# Patient Record
Sex: MALE | Race: ASIAN | NOT HISPANIC OR LATINO | URBAN - METROPOLITAN AREA
[De-identification: names, ages, dates, MRNs, and addresses within clinical notes are randomized per-mention and may not be internally consistent; named-entity substitution may affect disease eponyms.]

---
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
## 2024-02-19 ENCOUNTER — EMERGENCY (EMERGENCY)
Facility: HOSPITAL | Age: 24
LOS: 1 days | Discharge: HOME | End: 2024-02-19
Attending: STUDENT IN AN ORGANIZED HEALTH CARE EDUCATION/TRAINING PROGRAM | Admitting: STUDENT IN AN ORGANIZED HEALTH CARE EDUCATION/TRAINING PROGRAM
Payer: MEDICAID

## 2024-02-19 VITALS
HEART RATE: 83 BPM | TEMPERATURE: 98 F | OXYGEN SATURATION: 100 % | RESPIRATION RATE: 16 BRPM | DIASTOLIC BLOOD PRESSURE: 75 MMHG | SYSTOLIC BLOOD PRESSURE: 111 MMHG

## 2024-02-19 VITALS
RESPIRATION RATE: 16 BRPM | TEMPERATURE: 99 F | SYSTOLIC BLOOD PRESSURE: 115 MMHG | HEART RATE: 81 BPM | DIASTOLIC BLOOD PRESSURE: 75 MMHG | OXYGEN SATURATION: 100 %

## 2024-02-19 LAB
ADD ON TEST-SPECIMEN IN LAB: SIGNIFICANT CHANGE UP
ALBUMIN SERPL ELPH-MCNC: 4.4 G/DL — SIGNIFICANT CHANGE UP (ref 3.3–5)
ALP SERPL-CCNC: 64 U/L — SIGNIFICANT CHANGE UP (ref 40–120)
ALT FLD-CCNC: 18 U/L — SIGNIFICANT CHANGE UP (ref 4–41)
AMPHET UR-MCNC: NEGATIVE — SIGNIFICANT CHANGE UP
ANION GAP SERPL CALC-SCNC: 13 MMOL/L — SIGNIFICANT CHANGE UP (ref 7–14)
APAP SERPL-MCNC: <10 UG/ML — LOW (ref 15–25)
APPEARANCE UR: CLEAR — SIGNIFICANT CHANGE UP
AST SERPL-CCNC: 19 U/L — SIGNIFICANT CHANGE UP (ref 4–40)
BARBITURATES UR SCN-MCNC: NEGATIVE — SIGNIFICANT CHANGE UP
BASOPHILS # BLD AUTO: 0.01 K/UL — SIGNIFICANT CHANGE UP (ref 0–0.2)
BASOPHILS NFR BLD AUTO: 0.2 % — SIGNIFICANT CHANGE UP (ref 0–2)
BENZODIAZ UR-MCNC: NEGATIVE — SIGNIFICANT CHANGE UP
BILIRUB SERPL-MCNC: 0.3 MG/DL — SIGNIFICANT CHANGE UP (ref 0.2–1.2)
BILIRUB UR-MCNC: NEGATIVE — SIGNIFICANT CHANGE UP
BUN SERPL-MCNC: 8 MG/DL — SIGNIFICANT CHANGE UP (ref 7–23)
CALCIUM SERPL-MCNC: 9.8 MG/DL — SIGNIFICANT CHANGE UP (ref 8.4–10.5)
CHLORIDE SERPL-SCNC: 103 MMOL/L — SIGNIFICANT CHANGE UP (ref 98–107)
CO2 SERPL-SCNC: 23 MMOL/L — SIGNIFICANT CHANGE UP (ref 22–31)
COCAINE METAB.OTHER UR-MCNC: NEGATIVE — SIGNIFICANT CHANGE UP
COLOR SPEC: YELLOW — SIGNIFICANT CHANGE UP
CREAT SERPL-MCNC: 0.78 MG/DL — SIGNIFICANT CHANGE UP (ref 0.5–1.3)
CREATININE URINE RESULT, DAU: 64 MG/DL — SIGNIFICANT CHANGE UP
DIFF PNL FLD: NEGATIVE — SIGNIFICANT CHANGE UP
EGFR: 129 ML/MIN/1.73M2 — SIGNIFICANT CHANGE UP
EOSINOPHIL # BLD AUTO: 0.02 K/UL — SIGNIFICANT CHANGE UP (ref 0–0.5)
EOSINOPHIL NFR BLD AUTO: 0.3 % — SIGNIFICANT CHANGE UP (ref 0–6)
ETHANOL SERPL-MCNC: <10 MG/DL — SIGNIFICANT CHANGE UP
FLUAV AG NPH QL: SIGNIFICANT CHANGE UP
FLUBV AG NPH QL: SIGNIFICANT CHANGE UP
GLUCOSE SERPL-MCNC: 113 MG/DL — HIGH (ref 70–99)
GLUCOSE UR QL: NEGATIVE MG/DL — SIGNIFICANT CHANGE UP
HCT VFR BLD CALC: 42.3 % — SIGNIFICANT CHANGE UP (ref 39–50)
HGB BLD-MCNC: 13.6 G/DL — SIGNIFICANT CHANGE UP (ref 13–17)
IANC: 3.81 K/UL — SIGNIFICANT CHANGE UP (ref 1.8–7.4)
IMM GRANULOCYTES NFR BLD AUTO: 0.2 % — SIGNIFICANT CHANGE UP (ref 0–0.9)
KETONES UR-MCNC: NEGATIVE MG/DL — SIGNIFICANT CHANGE UP
LEUKOCYTE ESTERASE UR-ACNC: NEGATIVE — SIGNIFICANT CHANGE UP
LYMPHOCYTES # BLD AUTO: 1.66 K/UL — SIGNIFICANT CHANGE UP (ref 1–3.3)
LYMPHOCYTES # BLD AUTO: 28.3 % — SIGNIFICANT CHANGE UP (ref 13–44)
MAGNESIUM SERPL-MCNC: 2.3 MG/DL — SIGNIFICANT CHANGE UP (ref 1.6–2.6)
MCHC RBC-ENTMCNC: 27.5 PG — SIGNIFICANT CHANGE UP (ref 27–34)
MCHC RBC-ENTMCNC: 32.2 GM/DL — SIGNIFICANT CHANGE UP (ref 32–36)
MCV RBC AUTO: 85.5 FL — SIGNIFICANT CHANGE UP (ref 80–100)
METHADONE UR-MCNC: NEGATIVE — SIGNIFICANT CHANGE UP
MONOCYTES # BLD AUTO: 0.36 K/UL — SIGNIFICANT CHANGE UP (ref 0–0.9)
MONOCYTES NFR BLD AUTO: 6.1 % — SIGNIFICANT CHANGE UP (ref 2–14)
NEUTROPHILS # BLD AUTO: 3.81 K/UL — SIGNIFICANT CHANGE UP (ref 1.8–7.4)
NEUTROPHILS NFR BLD AUTO: 64.9 % — SIGNIFICANT CHANGE UP (ref 43–77)
NITRITE UR-MCNC: NEGATIVE — SIGNIFICANT CHANGE UP
NRBC # BLD: 0 /100 WBCS — SIGNIFICANT CHANGE UP (ref 0–0)
NRBC # FLD: 0 K/UL — SIGNIFICANT CHANGE UP (ref 0–0)
OPIATES UR-MCNC: NEGATIVE — SIGNIFICANT CHANGE UP
OXYCODONE UR-MCNC: NEGATIVE — SIGNIFICANT CHANGE UP
PCP SPEC-MCNC: SIGNIFICANT CHANGE UP
PCP UR-MCNC: NEGATIVE — SIGNIFICANT CHANGE UP
PH UR: 6.5 — SIGNIFICANT CHANGE UP (ref 5–8)
PHOSPHATE SERPL-MCNC: 3.3 MG/DL — SIGNIFICANT CHANGE UP (ref 2.5–4.5)
PLATELET # BLD AUTO: 317 K/UL — SIGNIFICANT CHANGE UP (ref 150–400)
POTASSIUM SERPL-MCNC: 4.2 MMOL/L — SIGNIFICANT CHANGE UP (ref 3.5–5.3)
POTASSIUM SERPL-SCNC: 4.2 MMOL/L — SIGNIFICANT CHANGE UP (ref 3.5–5.3)
PROT SERPL-MCNC: 7.7 G/DL — SIGNIFICANT CHANGE UP (ref 6–8.3)
PROT UR-MCNC: NEGATIVE MG/DL — SIGNIFICANT CHANGE UP
RBC # BLD: 4.95 M/UL — SIGNIFICANT CHANGE UP (ref 4.2–5.8)
RBC # FLD: 12 % — SIGNIFICANT CHANGE UP (ref 10.3–14.5)
RSV RNA NPH QL NAA+NON-PROBE: SIGNIFICANT CHANGE UP
SALICYLATES SERPL-MCNC: <0.3 MG/DL — LOW (ref 15–30)
SARS-COV-2 RNA SPEC QL NAA+PROBE: SIGNIFICANT CHANGE UP
SODIUM SERPL-SCNC: 139 MMOL/L — SIGNIFICANT CHANGE UP (ref 135–145)
SP GR SPEC: 1.01 — SIGNIFICANT CHANGE UP (ref 1–1.03)
THC UR QL: POSITIVE
TOXICOLOGY SCREEN, DRUGS OF ABUSE, SERUM RESULT: SIGNIFICANT CHANGE UP
TSH SERPL-MCNC: <0.1 UIU/ML — LOW (ref 0.27–4.2)
UROBILINOGEN FLD QL: 0.2 MG/DL — SIGNIFICANT CHANGE UP (ref 0.2–1)
WBC # BLD: 5.87 K/UL — SIGNIFICANT CHANGE UP (ref 3.8–10.5)
WBC # FLD AUTO: 5.87 K/UL — SIGNIFICANT CHANGE UP (ref 3.8–10.5)

## 2024-02-19 PROCEDURE — 99284 EMERGENCY DEPT VISIT MOD MDM: CPT

## 2024-02-19 RX ORDER — METOCLOPRAMIDE HCL 10 MG
10 TABLET ORAL ONCE
Refills: 0 | Status: COMPLETED | OUTPATIENT
Start: 2024-02-19 | End: 2024-02-19

## 2024-02-19 RX ORDER — SODIUM CHLORIDE 9 MG/ML
1000 INJECTION INTRAMUSCULAR; INTRAVENOUS; SUBCUTANEOUS ONCE
Refills: 0 | Status: COMPLETED | OUTPATIENT
Start: 2024-02-19 | End: 2024-02-19

## 2024-02-19 RX ORDER — FAMOTIDINE 10 MG/ML
1 INJECTION INTRAVENOUS
Qty: 14 | Refills: 0
Start: 2024-02-19 | End: 2024-02-25

## 2024-02-19 RX ORDER — HYDROXYZINE HCL 10 MG
1 TABLET ORAL
Qty: 5 | Refills: 0
Start: 2024-02-19 | End: 2024-02-23

## 2024-02-19 RX ORDER — ONDANSETRON 8 MG/1
1 TABLET, FILM COATED ORAL
Qty: 12 | Refills: 0
Start: 2024-02-19 | End: 2024-02-22

## 2024-02-19 RX ORDER — FAMOTIDINE 10 MG/ML
20 INJECTION INTRAVENOUS ONCE
Refills: 0 | Status: COMPLETED | OUTPATIENT
Start: 2024-02-19 | End: 2024-02-19

## 2024-02-19 RX ORDER — HYDROXYZINE HCL 10 MG
25 TABLET ORAL ONCE
Refills: 0 | Status: COMPLETED | OUTPATIENT
Start: 2024-02-19 | End: 2024-02-19

## 2024-02-19 RX ADMIN — FAMOTIDINE 20 MILLIGRAM(S): 10 INJECTION INTRAVENOUS at 18:27

## 2024-02-19 RX ADMIN — Medication 25 MILLIGRAM(S): at 21:10

## 2024-02-19 RX ADMIN — Medication 10 MILLIGRAM(S): at 18:27

## 2024-02-19 RX ADMIN — SODIUM CHLORIDE 1000 MILLILITER(S): 9 INJECTION INTRAMUSCULAR; INTRAVENOUS; SUBCUTANEOUS at 18:27

## 2024-02-19 NOTE — ED PROVIDER NOTE - PATIENT PORTAL LINK FT
You can access the FollowMyHealth Patient Portal offered by [facility] by registering at the following website: [email or web address]. By joining [employer]’s FollowMyHealth portal, you will also be able to view your health information using other applications (apps) compatible with our system.

## 2024-02-19 NOTE — ED PROVIDER NOTE - PHYSICAL EXAMINATION
Vital signs reviewed.   CONSTITUTIONAL: Well-appearing; well-nourished; in no apparent distress. Non-toxic appearing.   HEAD: Normocephalic, atraumatic.  EYES: PERRL, EOM intact, conjunctiva and sclera WNL.  CARD: Normal S1, S2; no murmurs, rubs, or gallops noted.  RESP: Normal chest excursion with respiration; breath sounds clear and equal bilaterally; no wheezes, rhonchi, or rales.  ABD/GI: soft, non-distended; non-tender; no palpable organomegaly, no pulsatile mass.  EXT/MS: moves all extremities; distal pulses are normal, no pedal edema.  SKIN: Normal for age and race; warm; dry; good turgor; no apparent lesions or exudate noted.  NEURO: Awake, alert, oriented x 3, no gross deficits, CN II-XII grossly intact, no motor or sensory deficit noted.  PSYCH: Normal mood; appropriate affect. Reporting anxiousness, appears mildly anxious.

## 2024-02-19 NOTE — SBIRT NOTE ADULT - NSSBIRTDRGPASSREFTXDET_GEN_A_CORE
SW met with patient and mother at bedside. Patient provided consent for mother to be present for conversation. Patient reports that he has been  SW met with patient and mother at bedside. Patient provided consent for mother to be present for conversation. Patient reports that over the past year he has been using opioids, mainly Percocet, that he was sold on the streets. Patient denies any use since Saturday, 2 days ago and is experiencing nausea, diarrhea, abdominal cramping, chills, subjective fevers, nasal congestion, increased urination, anxiousness, restlessness.  Patient presents to the emergency department with his mother asking for help and assistance with information to stop. SW supported and provided education on SBIRT resources which patient accepted and agreed to call. SBIRT resources are at bedside. SW to follow as needed.

## 2024-02-19 NOTE — ED PROVIDER NOTE - PROGRESS NOTE DETAILS
patient labs wnl, aside from TSH < 0.10.   Thyroid panel added on. Call back team called to follow up. Patient requesting to go.

## 2024-02-19 NOTE — ED PROVIDER NOTE - CLINICAL SUMMARY MEDICAL DECISION MAKING FREE TEXT BOX
23-year-old male with no reported past medical history, reports over the past year he has been using opioids that he was sold on the streets and since Saturday, 2 days ago patient stopped using Percocet and is coming in with complaint of nausea multiple episodes of nonbloody nonbilious vomiting, diarrhea, abdominal cramping, chills, subjective fevers, nasal congestion, increased urination, anxiousness, restlessness.  Patient presents to the emergency department with his mother asking for help and assistance with information to stop.    Patient vitally stable and overall well-appearing, mildly anxious with a nonfocal nontender examination.  Patient describing signs of opioid withdrawal.  Check basic labs, provide symptom relief with fluids, Pepcid, Reglan.  Social work involved to provide patient information for help stopping opioid abuse

## 2024-02-19 NOTE — ED ADULT NURSE NOTE - OBJECTIVE STATEMENT
Patient received to intake room 5 A&Ox4, ambulatory, accompanied by family member endorsing general malaise N/V over the last 2 days. Patient endorsing to take approximately 2 percocet everyday x 1 year, last took on Saturday. RR equal and unlabored, noted to be mildly tremulous. Denies chest pain, sob, blurry vision. Denies SI,HI, hallucinations. Denies alcohol use. Endorsing intermittent marijuana use. 20G IV placed in the R AC, labs drawn and sent. Care plan continued. Comfort measures provided. Safety maintained. Awaiting lab results.

## 2024-02-19 NOTE — ED PROVIDER NOTE - NSFOLLOWUPINSTRUCTIONS_ED_ALL_ED_FT
Your labs are normal aside from a low thyroid-stimulating hormone.  More labs were added on to be followed up by our callback team.  However you can call [phone] for results.  Take Zofran 4 mg dissolving tablet every 8 hours as needed for nausea vomiting.  Can take Atarax at bedtime as needed for restlessness/anxiety.  Can take Pepcid 20 mg every 12 hours as needed for stomach upset.    Opioid Withdrawal  Opioids are powerful substances that relieve pain. Opioids include illegal drugs, such as heroin, as well as prescription pain medicines, such as codeine, morphine, hydrocodone, and fentanyl. Opioid withdrawal can happen if you have been taking opioids for a period of time and then suddenly stop.    Opioid withdrawal can be mild to severe, and it may include a variety of different symptoms. It is not usually life-threatening.    What are the causes?  This condition is caused by taking opioids for a long period of time, usually over several weeks (or even only 5 days), and then doing any of the following:  Stopping use completely.  Rapidly reducing their use, either by reducing the dose or the frequency of use.  Taking a medicine to block the effect of the opioid (opioid antagonist).  What increases the risk?  The following factors may make you more likely to develop this condition:  Taking opioids for a long period of time.  Taking opioids incorrectly.  Having a history of opioid, alcohol, or illicit drug use and withdrawal from those substances.  What are the signs or symptoms?  Symptoms of this condition can be physical or mental. Mild physical symptoms include:  Nausea.  Muscle aches or spasms.  Watery eyes and runny nose.  Widening of the dark centers of the eyes (dilated pupils).  Flushing and itching of the skin.  Moderate symptoms of this condition include:  Stomach cramps and diarrhea.  Vomiting.  Increased blood pressure and fast pulse.  Chills or sweating.  Twitching or shaking (tremors).  Mental symptoms include:  Depression.  Anxiety.  Restlessness and irritability.  Trouble sleeping.  Increased craving for drugs.  When symptoms start and how long they last depend on the kind of opioid you have been taking.  Short-acting opioids, such as heroin and oxycodone, work fast and then lose their effect quickly. Symptoms occur within hours of stopping or reducing the amount you take. The worst symptoms (peak withdrawal) occur in 24–48 hours. Symptoms should diminish over the next 3 to 5 days.  Long-acting opioids, such as methadone, work for a longer period of time. Symptoms can occur within 30 hours of stopping or reducing the amount you take, and they usually resolve over the next 10 days or so.  There are also medicines that block the effects of opioids (opioid antagonists), such as naltrexone or naloxone, and partial agonists such as buprenorphine. If you take one of these medicines, symptoms begin within minutes.    How is this diagnosed?  This condition is diagnosed based on:  Your symptoms.  Your medical history, including:  Your history of drug and alcohol use.  The medicines you have been taking.  A physical exam.  Other tests, such as an ECG to look at the rhythm of your heart or blood tests to check for problems.  Your health care provider may ask you to see a mental health professional or addiction specialist.    How is this treated?  A group of people participating in a support group or counseling session.  Treatment for this condition is usually provided by mental health professionals with training in substance use disorders (addiction specialists). Treatment may involve:  Counseling. This treatment is also called talk therapy. It is provided by substance use treatment counselors.  Support groups. Support groups are run by people who have quit using opioids. They provide emotional support, advice, and guidance.  Medicines. The medicines used may depend on the severity of your withdrawal. Options may include:  Medicines to help reduce certain withdrawal symptoms, such as nausea, vomiting, diarrhea, or stomach cramps.  An opioid or opioid-like medicine, such as methadone or buprenorphine, to replace the opioid that you have been taking. You may be asked to take less and less of this medicine over time to reduce or prevent withdrawal symptoms.  Other medicines that may decrease the effects of withdrawal.  Follow these instructions at home:  Take over-the-counter and prescription medicines only as told by your health care provider.  Always check with your health care provider before starting any new medicines.  Keep all follow-up visits. This is important. Follow-up visits include mental health and counseling visits.  Contact a health care provider if:  You are not able to take your medicines as told.  Your symptoms get worse.  You take an opioid after stopping use, or you take more of an opioid than you have been.  You have questions about how to take your medicines or you are unsure of when to take them.  Get help right away if:  You have a seizure.  You lose consciousness.  You cannot stop vomiting.  You are unable to drink or eat, and you become weak and dizzy.  You develop chest pain, shortness of breath, or fever.  You have serious thoughts about hurting yourself or others.  These symptoms may represent a serious problem that is an emergency. Do not wait to see if the symptoms will go away. Get medical help right away. Call your local emergency services (693 in the [place] Do not drive yourself to the hospital.    If you ever feel like you may hurt yourself or others, or have thoughts about taking your own life, get help right away. Go to your nearest emergency department or:  Call your local emergency services (859 in the [place]  Call a suicide crisis helpline, such as the National Suicide Prevention Lifeline at 1-310.344.7311 or 418 in the [place] This is open 24 hours a day in the [place]  Text the Crisis Text Line at 497657 (in the [place]  Summary  Opioid withdrawal is a group of symptoms that can occur if you have been taking opioids for a period of time and suddenly stop.  Symptoms range from mild to severe, but opioid withdrawal is usually not life-threatening.  Common symptoms include anxiety, tremors, chills, body aches, nausea, vomiting, and diarrhea. Symptoms may last 5–10 days or so depending on the type of opioids that were involved.  Treatment may include counseling, support groups, and medicines.  This information is not intended to replace advice given to you by your health care provider. Make sure you discuss any questions you have with your health care provider.

## 2024-02-19 NOTE — SBIRT NOTE ADULT - NSSBIRTALCPOSREINDET_GEN_A_CORE
SW met with patient at bedside to discuss alcohol use. Patient reports that he mainly drinks in social settings around 2-4 times per month. SW provided education on SBIRT resources which patient and mother accepted. Resource list were left at bedside.

## 2024-02-19 NOTE — ED PROVIDER NOTE - ATTENDING APP SHARED VISIT CONTRIBUTION OF CARE
I performed a face to face evaluation of this patient and obtained a history and performed a full exam.  I agree with the history, physical exam and plan of the SHARONDA

## 2024-02-20 LAB
CULTURE RESULTS: NO GROWTH — SIGNIFICANT CHANGE UP
SPECIMEN SOURCE: SIGNIFICANT CHANGE UP